# Patient Record
Sex: FEMALE | Race: WHITE
[De-identification: names, ages, dates, MRNs, and addresses within clinical notes are randomized per-mention and may not be internally consistent; named-entity substitution may affect disease eponyms.]

---

## 2018-06-12 ENCOUNTER — HOSPITAL ENCOUNTER (OUTPATIENT)
Dept: HOSPITAL 56 - MW.SDS | Age: 63
Discharge: HOME | End: 2018-06-12
Attending: SURGERY
Payer: COMMERCIAL

## 2018-06-12 DIAGNOSIS — Z12.11: Primary | ICD-10-CM

## 2018-06-12 DIAGNOSIS — F41.9: ICD-10-CM

## 2018-06-12 DIAGNOSIS — F32.9: ICD-10-CM

## 2018-06-12 DIAGNOSIS — Z80.0: ICD-10-CM

## 2018-06-12 DIAGNOSIS — E66.9: ICD-10-CM

## 2018-06-12 DIAGNOSIS — Z79.899: ICD-10-CM

## 2018-06-12 DIAGNOSIS — D12.5: ICD-10-CM

## 2018-06-12 DIAGNOSIS — Z86.010: ICD-10-CM

## 2018-06-12 DIAGNOSIS — D12.3: ICD-10-CM

## 2018-06-12 DIAGNOSIS — K57.30: ICD-10-CM

## 2018-06-12 DIAGNOSIS — D12.8: ICD-10-CM

## 2018-06-12 PROCEDURE — 45380 COLONOSCOPY AND BIOPSY: CPT

## 2018-06-12 NOTE — OR
SURGEON:

MARSHA PEREIRA MD

 

DATE OF PROCEDURE:  06/12/2018

 

PREOPERATIVE DIAGNOSIS:

History of colon polyps.

 

POSTOPERATIVE DIAGNOSES:

1. Rectal polyp.

2. Sigmoid colon polyp.

3. Transverse colon polyps x2.

 

PROCEDURE PERFORMED:

Screening colonoscopy.

 

ENDOSCOPIST:

Marsha Pereira MD

 

ANESTHESIA:

MAC.

 

INSTRUMENT USED:

Olympus colonoscope.

 

EXTENT OF EXAM:

To the cecum.

 

PREPARATION:

Good.

 

LIMITATIONS:

None.

 

INDICATION FOR EXAMINATION:

The patient is a 63-year-old female who presents for a repeat colonoscopy.  She

has had several in the past and always had polyps removed.  The patient and I

discussed the need for a repeat colonoscopy.  We discussed the procedure as well

as expected perioperative course.  We discussed the risks that including

bleeding, infection, or damage to surrounding structures including perforation.

The patient verbalized understanding and wishes to proceed.

 

PROCEDURE IN DETAIL:

The patient was brought into the endoscopy suite and placed in a left lateral

decubitus position.  A time-out was completed verifying the patient's name, age,

date of birth, allergies, and procedure to be performed.  Monitored anesthesia

care was induced and continuous oxygen was provided via face mask throughout the

procedure.  After adequate sedation was achieved, a digital rectal exam was

performed.  This exam was within normal limits.  A well lubricated colonoscope

was inserted into the rectum and advanced under direct visualization to the

level of the cecum.  The cecum was identified by both visual and anatomic

landmarks.  A photograph was taken at the cecal cap.  The scope was then fully

withdrawn while examining the color, texture, anatomy, and integrity of the

mucosa from the cecum to the anal canal.  The patient was found to have 2

proximal transverse colon polyps.  These were 2 to 3 mm in size.  They were both

removed using cold biopsy forceps.  The patient had a 1 to 2 mm polyp within the

sigmoid colon.  This was removed in a similar fashion.  The scope was brought

into the rectum and the patient was noted to have a 3 to 4 mm polyp there.  This

was removed in piecemeal fashion using a cold biopsy forceps.  The patient had

diffuse diverticulosis throughout the colon, but most of her diverticula were

located within the distal half.  The scope was then retroflexed within the

rectum to allow visualization of the anal canal opening.  This appeared normal

and a photograph was taken.  The scope was then straightened out and removed

from the patient.  The cecum to anus time was 14 minutes.  The patient tolerated

the procedure well, was taken to PACU in stable condition.

 

ENDOSCOPIC DIAGNOSES:

1. Rectal colon polyp.

2. Sigmoid colon polyp.

3. Transverse colon polyps x2.

 

RECOMMENDATIONS:

Follow up in clinic in 2 weeks.

 

 

VICKY LEIVA

DD:  06/12/2018 09:22:50

DT:  06/12/2018 15:44:44

Job #:  [id number]

## 2018-06-12 NOTE — PCM.PREANE
Preanesthetic Assessment





- Anesthesia/Transfusion/Family Hx


Anesthesia History: Prior Anesthesia Without Reaction


Family History of Anesthesia Reaction: No


Transfusion History: No Prior Transfusion(s)


Intubation History: Unknown





- Review of Systems


General: No Symptoms


Pulmonary: No Symptoms


Cardiovascular: No Symptoms


Gastrointestinal: No Symptoms


Neurological: No Symptoms


Other: Reports: None





- Physical Assessment


Height: 1.6 m


Weight: 90.265 kg


ASA Class: 2


Mental Status: Alert & Oriented x3


Airway Class: Mallampati = 2


Dentition: Reports: Normal Dentition


Thyro-Mental Finger Breadths: 2


Mouth Opening Finger Breadths: 2


ROM/Head Extension: Full


Lungs: Clear to Auscultation, Normal Respiratory Effort


Cardiovascular: Regular Rate, Regular Rhythm





- Allergies


Allergies/Adverse Reactions: 


 Allergies











Allergy/AdvReac Type Severity Reaction Status Date / Time


 


No Known Allergies Allergy   Verified 06/07/18 12:22














- Blood


Blood Available: No





- Anesthesia Plan


Pre-Op Medication Ordered: None





- Acknowledgements


Anesthesia Type Planned: MAC


Pt an Appropriate Candidate for the Planned Anesthesia: Yes


Alternatives and Risks of Anesthesia Discussed w Pt/Guardian: Yes


Pt/Guardian Understands and Agrees with Anesthesia Plan: Yes





PreAnesthesia Questionnaire


HEENT History: Reports: Other (See Below)


Other HEENT History: wears glasses


Cardiovascular History: Reports: None


Respiratory History: Reports: Other (See Below)


Other Respiratory History: "may have sleep apnea but have not been tested"


Gastrointestinal History: Reports: Colon Polyp, Other (See Below)


Other Gastrointestinal History: occasional heartburn


Genitourinary History: Reports: None


OB/GYN History: Reports: Pregnancy


Psychiatric History: Reports: Anxiety, Depression


Endocrine/Metabolic History: Reports: Obesity/BMI 30+


Oncologic (Cancer) History: Reports: None





- Past Surgical History


Head Surgeries/Procedures: Reports: None


Cardiovascular Surgical History: Reports: Other (See Below)


Other Cardiovascular Surgeries/Procedures: hx varicose vein ligation


GI Surgical History: Reports: Colonoscopy (x3)


Female  Surgical History: Reports: Breast Biopsy, Tubal Ligation, Other (See 

Below)


Other Female  Surgeries/Procedures: laparoscopy with rt S&O, breast lumpectomy


Oncologic Surgical History: Reports: Biopsy of Breast





- SUBSTANCE USE


Smoking Status *Q: Never Smoker


Recreational Drug Use History: No





- HOME MEDS


Home Medications: 


 Home Meds





Calcium Carbonate/Vitamin D3 [Calcium 600 + Vit D Tablet] 1 tab PO DAILY 06/07/ 18 [History]


Fexofenadine HCl [Allegra Allergy] 1 tab PO DAILY 06/07/18 [History]


Venlafaxine HCl [Venlafaxine ER] 1 tab PO DAILY 06/07/18 [History]











- CURRENT (IN HOUSE) MEDS


Current Meds: 





 Current Medications





Lactated Ringer's (Ringers, Lactated)  1,000 mls @ 125 mls/hr IV ASDIRECTED MCKENNA


Sodium Chloride (Saline Flush)  10 ml FLUSH ASDIRECTED PRN


   PRN Reason: Keep Vein Open


Sodium Chloride (Saline Flush)  2.5 ml FLUSH ASDIRECTED PRN


   PRN Reason: Keep Vein Open


Sodium Chloride (Saline Flush)  10 ml FLUSH ASDIRECTED PRN


   PRN Reason: Keep Vein Open


Sodium Chloride (Saline Flush)  2.5 ml FLUSH ASDIRECTED PRN


   PRN Reason: Keep Vein Open

## 2018-06-12 NOTE — PCM.[email or web address]
- General Post-Op/Procedure Note


Date of Surgery/Procedure: 06/12/18


Operative Procedure(s): Colonoscopy


Findings: 


Transverse colon polyps x 2, rectal polyp, sigmoid colon polyp 


Pre Op Diagnosis: History of colon polyps


Post-Op Diagnosis: Transverse colon polyps x 2, rectal polyp, sigmoid colon 

polyp


Anesthesia Technique: MAC


Primary Surgeon: Marsha Pereira


Condition: Good

## 2018-06-12 NOTE — [id number]
Post Anesthesia Note





- EVALUATION WITHIN 48HRS OF ANESTHETIC


Vital Signs in Normal Range: Yes


Patient Participated in Evaluation: Yes


Respiratory Function Stable: Yes


Airway Patent: Yes


Cardiovascular Function Stable: Yes


Hydration Status Stable: Yes


Pain Control Satisfactory: Yes


Nausea and Vomiting Control Satisfactory: Yes


Mental Status Recovered: Yes


Resp Rate: 18





- COMMENTS/OBSERVATIONS


Free Text/Narrative:: 





no anesthesia problemsno anesthesia problems

## 2023-02-28 ENCOUNTER — HOSPITAL ENCOUNTER (OUTPATIENT)
Dept: HOSPITAL 56 - MW.SDS | Age: 68
Discharge: HOME | End: 2023-02-28
Attending: SURGERY
Payer: MEDICARE

## 2023-02-28 DIAGNOSIS — Z86.010: ICD-10-CM

## 2023-02-28 DIAGNOSIS — M85.80: ICD-10-CM

## 2023-02-28 DIAGNOSIS — Z88.0: ICD-10-CM

## 2023-02-28 DIAGNOSIS — Z12.11: Primary | ICD-10-CM

## 2023-02-28 DIAGNOSIS — M81.0: ICD-10-CM

## 2023-02-28 DIAGNOSIS — E78.5: ICD-10-CM

## 2023-02-28 DIAGNOSIS — Z80.0: ICD-10-CM

## 2023-02-28 DIAGNOSIS — I10: ICD-10-CM

## 2023-02-28 DIAGNOSIS — D12.0: ICD-10-CM

## 2023-02-28 DIAGNOSIS — K57.30: ICD-10-CM

## 2023-02-28 PROCEDURE — 45385 COLONOSCOPY W/LESION REMOVAL: CPT
